# Patient Record
Sex: FEMALE | Race: BLACK OR AFRICAN AMERICAN | NOT HISPANIC OR LATINO
[De-identification: names, ages, dates, MRNs, and addresses within clinical notes are randomized per-mention and may not be internally consistent; named-entity substitution may affect disease eponyms.]

---

## 2018-08-08 ENCOUNTER — APPOINTMENT (OUTPATIENT)
Dept: PLASTIC SURGERY | Facility: CLINIC | Age: 33
End: 2018-08-08

## 2018-09-19 ENCOUNTER — APPOINTMENT (OUTPATIENT)
Dept: PLASTIC SURGERY | Facility: CLINIC | Age: 33
End: 2018-09-19

## 2018-09-19 ENCOUNTER — OUTPATIENT (OUTPATIENT)
Dept: OUTPATIENT SERVICES | Facility: HOSPITAL | Age: 33
LOS: 1 days | End: 2018-09-19

## 2018-09-19 VITALS
RESPIRATION RATE: 16 BRPM | HEART RATE: 106 BPM | BODY MASS INDEX: 25.66 KG/M2 | SYSTOLIC BLOOD PRESSURE: 112 MMHG | DIASTOLIC BLOOD PRESSURE: 77 MMHG | WEIGHT: 154 LBS | HEIGHT: 65 IN

## 2018-10-23 ENCOUNTER — OUTPATIENT (OUTPATIENT)
Dept: OUTPATIENT SERVICES | Facility: HOSPITAL | Age: 33
LOS: 1 days | End: 2018-10-23

## 2018-10-23 ENCOUNTER — APPOINTMENT (OUTPATIENT)
Dept: PLASTIC SURGERY | Facility: CLINIC | Age: 33
End: 2018-10-23

## 2018-10-24 DIAGNOSIS — Z01.812 ENCOUNTER FOR PREPROCEDURAL LABORATORY EXAMINATION: ICD-10-CM

## 2018-12-06 ENCOUNTER — OUTPATIENT (OUTPATIENT)
Dept: OUTPATIENT SERVICES | Facility: HOSPITAL | Age: 33
LOS: 1 days | End: 2018-12-06

## 2018-12-06 ENCOUNTER — APPOINTMENT (OUTPATIENT)
Dept: PLASTIC SURGERY | Facility: CLINIC | Age: 33
End: 2018-12-06

## 2018-12-06 VITALS
RESPIRATION RATE: 14 BRPM | DIASTOLIC BLOOD PRESSURE: 71 MMHG | HEART RATE: 75 BPM | TEMPERATURE: 99.6 F | SYSTOLIC BLOOD PRESSURE: 112 MMHG

## 2018-12-07 DIAGNOSIS — Z01.812 ENCOUNTER FOR PREPROCEDURAL LABORATORY EXAMINATION: ICD-10-CM

## 2019-01-04 ENCOUNTER — OUTPATIENT (OUTPATIENT)
Dept: OUTPATIENT SERVICES | Facility: HOSPITAL | Age: 34
LOS: 1 days | Discharge: HOME | End: 2019-01-04

## 2019-01-04 ENCOUNTER — TRANSCRIPTION ENCOUNTER (OUTPATIENT)
Age: 34
End: 2019-01-04

## 2019-01-04 RX ORDER — CEPHALEXIN 500 MG
1 CAPSULE ORAL
Qty: 21 | Refills: 0
Start: 2019-01-04 | End: 2019-01-10

## 2019-01-10 ENCOUNTER — APPOINTMENT (OUTPATIENT)
Dept: PLASTIC SURGERY | Facility: CLINIC | Age: 34
End: 2019-01-10

## 2019-01-10 ENCOUNTER — OUTPATIENT (OUTPATIENT)
Dept: OUTPATIENT SERVICES | Facility: HOSPITAL | Age: 34
LOS: 1 days | End: 2019-01-10

## 2019-01-10 VITALS
DIASTOLIC BLOOD PRESSURE: 78 MMHG | HEART RATE: 106 BPM | RESPIRATION RATE: 14 BRPM | SYSTOLIC BLOOD PRESSURE: 124 MMHG | TEMPERATURE: 98.5 F

## 2019-01-10 NOTE — PHYSICAL EXAM
[de-identified] : cast taken down\par incisions cdi\par swollen tip and nose\par minimal bruising\par cartliage graft intact\par tip of nose with superficial epidermolysis approx 0.5cm

## 2019-01-10 NOTE — ASSESSMENT
[FreeTextEntry1] : 33 yr F s/p secondary rhinoplasty 1 wk prior\par \par - cast taken off\par - half sutures removed\par - fu monday \par - ok to cleanse area gently

## 2019-01-10 NOTE — ASSESSMENT
[FreeTextEntry1] : 33 yr F s/p secondary rhinoplasty \par \par \par - cast taken off\par - half of sutures removed\par - follow up mon afternoon\par - ok to clean areas gently

## 2019-01-10 NOTE — HISTORY OF PRESENT ILLNESS
[FreeTextEntry1] : s/p secondary rhinoplasty last friday\par doing well\par no issues\par is happy with results\par

## 2019-01-11 DIAGNOSIS — Z09 ENCOUNTER FOR FOLLOW-UP EXAMINATION AFTER COMPLETED TREATMENT FOR CONDITIONS OTHER THAN MALIGNANT NEOPLASM: ICD-10-CM

## 2019-01-16 ENCOUNTER — APPOINTMENT (OUTPATIENT)
Dept: PLASTIC SURGERY | Facility: CLINIC | Age: 34
End: 2019-01-16

## 2019-01-16 ENCOUNTER — OUTPATIENT (OUTPATIENT)
Dept: OUTPATIENT SERVICES | Facility: HOSPITAL | Age: 34
LOS: 1 days | End: 2019-01-16

## 2019-01-16 DIAGNOSIS — Z09 ENCOUNTER FOR FOLLOW-UP EXAMINATION AFTER COMPLETED TREATMENT FOR CONDITIONS OTHER THAN MALIGNANT NEOPLASM: ICD-10-CM

## 2019-01-17 ENCOUNTER — APPOINTMENT (OUTPATIENT)
Dept: PLASTIC SURGERY | Facility: CLINIC | Age: 34
End: 2019-01-17

## 2019-01-17 ENCOUNTER — OUTPATIENT (OUTPATIENT)
Dept: OUTPATIENT SERVICES | Facility: HOSPITAL | Age: 34
LOS: 1 days | End: 2019-01-17

## 2019-01-17 NOTE — PHYSICAL EXAM
[de-identified] : smooth dorsal aesthetic lines\par swollen tip and nose, slightly derotated as expected\par incisions well healed\par stable  dorsal cartilage graft

## 2019-01-17 NOTE — HISTORY OF PRESENT ILLNESS
[FreeTextEntry1] : doing well, she loves her nose\par she desires liposuction of the abdomen now and umbilicoplasty\par she also wants cheek fillers

## 2019-01-17 NOTE — ASSESSMENT
[FreeTextEntry1] : 33 yr F s/p secondary rhinoplasty\par \par -all sutures removed\par - quote for lipo and umbilicoplasty\par -f/u 3 wks for fillers and general f/u

## 2019-01-18 DIAGNOSIS — Z09 ENCOUNTER FOR FOLLOW-UP EXAMINATION AFTER COMPLETED TREATMENT FOR CONDITIONS OTHER THAN MALIGNANT NEOPLASM: ICD-10-CM

## 2019-03-03 ENCOUNTER — TRANSCRIPTION ENCOUNTER (OUTPATIENT)
Age: 34
End: 2019-03-03

## 2019-03-27 ENCOUNTER — APPOINTMENT (OUTPATIENT)
Dept: PLASTIC SURGERY | Facility: CLINIC | Age: 34
End: 2019-03-27

## 2019-03-27 ENCOUNTER — OUTPATIENT (OUTPATIENT)
Dept: OUTPATIENT SERVICES | Facility: HOSPITAL | Age: 34
LOS: 1 days | End: 2019-03-27

## 2019-03-27 VITALS — BODY MASS INDEX: 25.46 KG/M2 | WEIGHT: 153 LBS

## 2019-03-27 NOTE — HISTORY OF PRESENT ILLNESS
[FreeTextEntry1] : marisol is here for follow up as well as inquiry for new procedures.\par she is happy with her nose\par she is concerned about her upper lip length and tooth show in repose and has seen oral surgeons for this as well as received cephalograms- she has a class 2 relationship but not enough to warrant a lefort 1.\par she is inquiring about liposuction of her abdomen, umbilical revision, and fat transfer to her breasts

## 2019-03-27 NOTE — ASSESSMENT
[FreeTextEntry1] : 33 yr F s/p open secondary rhino now here for lipo and fat transfer to breasts with umbilical scar revision\par \par - her nose is healing well. i informed her it would take a year for all swelling to go down at minimum. she is happy with her results\par - she desires lipo which is reasonable with fat transfer to breast. i informed her about a 50% survival rate of fat. she may need a round 2 or 3 which she is aware of and I informed her that I cant guarantee a D size but she is understanding. \par - her umbilicus is the result of a hernia which I told her we can fix but would require a scar around the area. she is agreeable\par \par - with regards to her upper lip, she can try botox, but I don’t think upper jaw surgery is recommended\par - we can attempt a mandibular angle shaving if she desires, but this would require preplanning, and imaging. \par she will think about it

## 2019-03-27 NOTE — PHYSICAL EXAM
[de-identified] : incisions well healed\par tip swollen\par symmetric dorsal aesthic lines\par no warping of dorsal cartilage grafts\par good tip projection and nasal length\par alar columellar relations improved\par nostril width similar to intercanthal distance\par \par she does not like her upper lip and tooth show on repose. she has a slight gummy smile with class 2 relationship\par her jaw appears sqaured in nature [de-identified] : breasts with striae , but around a B to C cup\par symmetric, grade 1 ptosis [de-identified] : good skin elasticity no scars\par umbilicus wide, protruberant, wrinkled

## 2019-03-27 NOTE — REASON FOR VISIT
[Consultation] : a consultation visit [FreeTextEntry1] : liposuction abdomen,flanks,repair of umbilicus

## 2019-03-28 DIAGNOSIS — Z01.89 ENCOUNTER FOR OTHER SPECIFIED SPECIAL EXAMINATIONS: ICD-10-CM

## 2019-04-10 ENCOUNTER — APPOINTMENT (OUTPATIENT)
Dept: PLASTIC SURGERY | Facility: CLINIC | Age: 34
End: 2019-04-10
Payer: COMMERCIAL

## 2019-04-10 ENCOUNTER — APPOINTMENT (OUTPATIENT)
Dept: PLASTIC SURGERY | Facility: CLINIC | Age: 34
End: 2019-04-10

## 2019-04-10 PROCEDURE — 99202 OFFICE O/P NEW SF 15 MIN: CPT

## 2019-04-10 NOTE — HISTORY OF PRESENT ILLNESS
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

## 2019-05-08 ENCOUNTER — TRANSCRIPTION ENCOUNTER (OUTPATIENT)
Age: 34
End: 2019-05-08

## 2019-05-24 ENCOUNTER — APPOINTMENT (OUTPATIENT)
Dept: PLASTIC SURGERY | Facility: CLINIC | Age: 34
End: 2019-05-24

## 2019-05-24 ENCOUNTER — APPOINTMENT (OUTPATIENT)
Dept: PLASTIC SURGERY | Facility: CLINIC | Age: 34
End: 2019-05-24
Payer: COMMERCIAL

## 2019-05-24 PROCEDURE — 14060 TIS TRNFR E/N/E/L 10 SQ CM/<: CPT

## 2019-05-28 ENCOUNTER — APPOINTMENT (OUTPATIENT)
Dept: PLASTIC SURGERY | Facility: CLINIC | Age: 34
End: 2019-05-28

## 2019-05-28 ENCOUNTER — OUTPATIENT (OUTPATIENT)
Dept: OUTPATIENT SERVICES | Facility: HOSPITAL | Age: 34
LOS: 1 days | End: 2019-05-28

## 2019-05-28 NOTE — PROCEDURE
[FreeTextEntry1] : midface hyperplasia [FreeTextEntry4] : min [FreeTextEntry3] : lido w/ epi [FreeTextEntry2] : v-y lengthening of upper lip, 4x2cm [FreeTextEntry5] : none [FreeTextEntry6] : IC obtained. lido w/ epi injected,  15 blade used to incise horizontally.  4x2cm advancement performed.  closed incision vertically as v-y\par

## 2019-05-29 DIAGNOSIS — Z01.812 ENCOUNTER FOR PREPROCEDURAL LABORATORY EXAMINATION: ICD-10-CM

## 2019-05-31 ENCOUNTER — APPOINTMENT (OUTPATIENT)
Dept: PLASTIC SURGERY | Facility: CLINIC | Age: 34
End: 2019-05-31
Payer: COMMERCIAL

## 2019-05-31 PROCEDURE — 99024 POSTOP FOLLOW-UP VISIT: CPT

## 2019-06-07 ENCOUNTER — APPOINTMENT (OUTPATIENT)
Dept: PLASTIC SURGERY | Facility: CLINIC | Age: 34
End: 2019-06-07
Payer: COMMERCIAL

## 2019-06-07 PROCEDURE — 99024 POSTOP FOLLOW-UP VISIT: CPT

## 2019-06-11 ENCOUNTER — OUTPATIENT (OUTPATIENT)
Dept: OUTPATIENT SERVICES | Facility: HOSPITAL | Age: 34
LOS: 1 days | Discharge: HOME | End: 2019-06-11

## 2019-06-19 ENCOUNTER — APPOINTMENT (OUTPATIENT)
Dept: PLASTIC SURGERY | Facility: CLINIC | Age: 34
End: 2019-06-19

## 2019-06-19 ENCOUNTER — OUTPATIENT (OUTPATIENT)
Dept: OUTPATIENT SERVICES | Facility: HOSPITAL | Age: 34
LOS: 1 days | End: 2019-06-19

## 2019-06-19 VITALS
BODY MASS INDEX: 26.23 KG/M2 | SYSTOLIC BLOOD PRESSURE: 128 MMHG | WEIGHT: 157.6 LBS | TEMPERATURE: 99.8 F | HEART RATE: 121 BPM | DIASTOLIC BLOOD PRESSURE: 87 MMHG | RESPIRATION RATE: 17 BRPM

## 2019-06-19 VITALS — HEART RATE: 93 BPM | TEMPERATURE: 99.3 F

## 2019-06-19 NOTE — PHYSICAL EXAM
[de-identified] : umbilicus with superifical epidermolysis\par cap refil 2 seconds\par abdomen swollen\par demarcated line where binder was compressing inferiorly

## 2019-06-19 NOTE — ASSESSMENT
[FreeTextEntry1] : 33 yr F s/p liposuction of abdomen with umbilicoplasty\par \par - dc binder as this is causing a demarcated line- informed her to get a girdle\par - sutures removed\par - bacitracin umbilicus daily\par - fu 1 wk

## 2019-06-19 NOTE — HISTORY OF PRESENT ILLNESS
[FreeTextEntry1] : doing well s/p liposuction of abdomen and umbilicoplasty 1 wk prior\par no significant pain\par

## 2019-06-20 DIAGNOSIS — Z09 ENCOUNTER FOR FOLLOW-UP EXAMINATION AFTER COMPLETED TREATMENT FOR CONDITIONS OTHER THAN MALIGNANT NEOPLASM: ICD-10-CM

## 2019-06-26 ENCOUNTER — APPOINTMENT (OUTPATIENT)
Dept: PLASTIC SURGERY | Facility: CLINIC | Age: 34
End: 2019-06-26

## 2019-06-28 ENCOUNTER — APPOINTMENT (OUTPATIENT)
Dept: PLASTIC SURGERY | Facility: CLINIC | Age: 34
End: 2019-06-28
Payer: COMMERCIAL

## 2019-06-28 ENCOUNTER — APPOINTMENT (OUTPATIENT)
Dept: PLASTIC SURGERY | Facility: CLINIC | Age: 34
End: 2019-06-28

## 2019-06-28 PROCEDURE — 99024 POSTOP FOLLOW-UP VISIT: CPT

## 2019-07-22 ENCOUNTER — APPOINTMENT (OUTPATIENT)
Dept: PLASTIC SURGERY | Facility: CLINIC | Age: 34
End: 2019-07-22
Payer: COMMERCIAL

## 2019-07-22 PROCEDURE — 99214 OFFICE O/P EST MOD 30 MIN: CPT | Mod: 24

## 2019-08-06 ENCOUNTER — APPOINTMENT (OUTPATIENT)
Dept: PLASTIC SURGERY | Facility: CLINIC | Age: 34
End: 2019-08-06

## 2019-08-06 ENCOUNTER — OUTPATIENT (OUTPATIENT)
Dept: OUTPATIENT SERVICES | Facility: HOSPITAL | Age: 34
LOS: 1 days | End: 2019-08-06

## 2019-08-07 DIAGNOSIS — Z09 ENCOUNTER FOR FOLLOW-UP EXAMINATION AFTER COMPLETED TREATMENT FOR CONDITIONS OTHER THAN MALIGNANT NEOPLASM: ICD-10-CM

## 2019-08-08 ENCOUNTER — APPOINTMENT (OUTPATIENT)
Dept: PLASTIC SURGERY | Facility: CLINIC | Age: 34
End: 2019-08-08

## 2019-08-08 ENCOUNTER — OUTPATIENT (OUTPATIENT)
Dept: OUTPATIENT SERVICES | Facility: HOSPITAL | Age: 34
LOS: 1 days | End: 2019-08-08

## 2019-08-12 ENCOUNTER — APPOINTMENT (OUTPATIENT)
Dept: PLASTIC SURGERY | Facility: CLINIC | Age: 34
End: 2019-08-12

## 2019-08-12 DIAGNOSIS — Z09 ENCOUNTER FOR FOLLOW-UP EXAMINATION AFTER COMPLETED TREATMENT FOR CONDITIONS OTHER THAN MALIGNANT NEOPLASM: ICD-10-CM

## 2019-08-12 NOTE — PHYSICAL EXAM
[de-identified] : AA Female, Rosales VI, thick oily skin.  [de-identified] : Nasal Analysis-  moderate post-op swelling still present grossly and on palpation (mostly to tip)\par                            DALs symmetric, bony vault wnl `80% of base\par                            Wide alar base. Maintained  dorsal height. Underprojected nasal tip on profile view

## 2019-08-12 NOTE — ASSESSMENT
[FreeTextEntry1] : 34 y/o AA F 8 months s/p Revision Rhinoplasty presents with persistent complaint regarding her alar appearance. Based on an extensive and prolonged discussion regarding her identified deformity areas-areas to address we clarified that the width of her nostril is the primary factor that she would like addressed. The patient was seen with Dr Peres (Clinic Attending) this visit , during which we once more discussed the various factors at play in her still healing nose as well as the associated nasal appearances of her ethnicity.  It is our typical practice to forgo performing any revision rhinoplasty until at least one year out from a previous intervention/rhinoplasty so as to allow the swelling and inflammation to subside and better account for the final result. However, based on this patient's now characterized and identified focal complaint we offered performing a nostril sill excision, as the only possible intervention at this time, as it would not involve manipulation or excision of inflamed tissue and would be a targeted effort to address the widened appearance of her nostrils (again that she has identified as bothering her the most). This would be a conservative maneuver, possible to be done in clinic. \par \par She has agreed to proceed with this intervention, with the understanding that with each further revision her results may be less predictable. She understands that symmetry and scarring are key aspects of this procedure that are in particular difficult to predict.   \par \par We shall coordinate this procedure based on clinic staff availability in the coming months. \par

## 2019-08-12 NOTE — HISTORY OF PRESENT ILLNESS
[FreeTextEntry1] : 33 year old AA female known to this clinic, now 8 months s/p revision rhinoplasty by Dr. Oliveira, presents today for interval follow-up. She reports being displeased with the appearance of her ala. Of note, the patient has been seen by multiple plastic surgeons in the past 3 months regarding this and her other complaint of excessive toothy show on smile and repose (per pt she is awaiting insurance approval for vestibuloplasty by Dr Walker). She has difficulty identifying the particular aspect of her ala she does not like, namely the "roundness" and "shape" are not what she was expecting post-operative and appreciated her basal view more prior to her last revision rhinoplasty. She presents with multiple instagram pictures (of various different surgeons and patients) in hope of better communicating her desired look. Denies breathing issues. Overall, pleased with the projection and dorsal height of her nose, but feels the base and ala make her "look like a transgender," causing a loss of confidence.

## 2019-08-13 NOTE — HISTORY OF PRESENT ILLNESS
[FreeTextEntry1] : Patient is a 33 year old   woman who is known to the clinic. She presents with dissatisfaction of the appearance of her nasal alar width and shape after undergoing revision rhinoplasty in January 2018. 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 She also has complaint of excessive tooth show on smile and repose, dissatisfaction with facial acne scars, and hypertrophic scarring of her umbilicus after hernia repair and scar revision by a plastic surgeon. She is has aksi scheduled with another plastic surgeon to have a vestibuloplasty to correct her excessive tooth show.\par \par

## 2019-08-26 ENCOUNTER — APPOINTMENT (OUTPATIENT)
Dept: PLASTIC SURGERY | Facility: CLINIC | Age: 34
End: 2019-08-26

## 2019-08-28 ENCOUNTER — OUTPATIENT (OUTPATIENT)
Dept: OUTPATIENT SERVICES | Facility: HOSPITAL | Age: 34
LOS: 1 days | End: 2019-08-28

## 2019-08-28 ENCOUNTER — APPOINTMENT (OUTPATIENT)
Dept: PLASTIC SURGERY | Facility: CLINIC | Age: 34
End: 2019-08-28

## 2019-08-29 NOTE — ASSESSMENT
[FreeTextEntry1] : 34 yo AA Female presents approx 8 months s/p revision rhinoplasty with the persistent complaint of the appearance of her ala and self-identified excessive nostril show on frontal and lateral angles. We discussed the risks and benefits of undergoing further revisions. And based on extensive communication agreed to proceed with a conservative and focal revision effort of addressing her nostril show by performing a nostril sill reduction. \par This was performed in office today. The patient tolerated the procedure well and will return in 5-7 days for suture removal and interval follow-up. \par \par Dr. Chris was present/scrubbed for and supervised this procedure.

## 2019-08-29 NOTE — ASSESSMENT
[FreeTextEntry1] : 32 yo AA Female presents approx 8 months s/p revision rhinoplasty with the persistent complaint of the appearance of her ala and self-identified excessive nostril show on frontal and lateral angles. We discussed the risks and benefits of undergoing further revisions. And based on extensive communication agreed to proceed with a conservative and focal revision effort of addressing her nostril show by performing a nostril sill reduction. \par This was performed in office today. The patient tolerated the procedure well and will return in 5-7 days for suture removal and interval follow-up. \par \par Dr. Chris was present/scrubbed for and supervised this procedure.

## 2019-08-29 NOTE — PROCEDURE
[FreeTextEntry1] : Nasal Deformity- Enlarged Nares/Nostril Show;  [FreeTextEntry2] : Nasal Sill Reduction [FreeTextEntry3] : Local Anesthetic [FreeTextEntry4] : Less than 1cc [FreeTextEntry5] : None [FreeTextEntry6] : TIME OUT PERFORMED\par \par 5cc of 1% Lidocaine w/Epinephrine injected into bilateral nostril heather, alar bases, and infra-collumellar area. \par \par 15 minutes wait time performed for optimal anesthetic and vasoconstriction effect.\par \par Nasal area prepped and sterile drapes placed. Measurements taken and landmarks marked with marker. Assymetry noted (L>R).  #11 Blade employed to render wedge shaped excision of inferior nostril sill with extension towards alar base. \par \par 5-0 PDS for deep closure. 5-0 Plain Gut and 5-0 Nylon placed in interrupted fashion for superficial reapproximation. Bacitracin applied over incisions. \par \par Patient tolerated the procedure without issue.   [___ ml Inj] : Anesthesia: [unfilled] ~Uml [1%] : 1% [With Epi] : with epinephrine [Betadine] : using betadine [Simple Sutures] : simple sutures were used for the skin closure [___ # of Sutures] : [unfilled] [Size: ___-0] : [unfilled]-0 [Interrupted] : interrupted [Nylon] : nylon suture(s) [FreeTextEntry8] : Nostril Sill (bilateral)

## 2019-08-30 DIAGNOSIS — Z09 ENCOUNTER FOR FOLLOW-UP EXAMINATION AFTER COMPLETED TREATMENT FOR CONDITIONS OTHER THAN MALIGNANT NEOPLASM: ICD-10-CM

## 2019-09-04 ENCOUNTER — APPOINTMENT (OUTPATIENT)
Dept: PLASTIC SURGERY | Facility: CLINIC | Age: 34
End: 2019-09-04

## 2019-09-04 ENCOUNTER — OUTPATIENT (OUTPATIENT)
Dept: OUTPATIENT SERVICES | Facility: HOSPITAL | Age: 34
LOS: 1 days | End: 2019-09-04

## 2019-09-04 DIAGNOSIS — Z09 ENCOUNTER FOR FOLLOW-UP EXAMINATION AFTER COMPLETED TREATMENT FOR CONDITIONS OTHER THAN MALIGNANT NEOPLASM: ICD-10-CM

## 2019-09-04 NOTE — PHYSICAL EXAM
[de-identified] : Conversive female in no acute distress.  [de-identified] : Well healing incisions along bilateral nostril heather. \par Nylons in place.

## 2019-09-04 NOTE — ASSESSMENT
[FreeTextEntry1] : 32 yo AA female presents 7 days out from her in-office procedure- Bilateral Nostril Sill Reduction.  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  Also discussed possible fractora procedure for her acne scarring to be done in future.  \par - Sutures removed\par - Wound Care reiterated

## 2019-09-13 ENCOUNTER — APPOINTMENT (OUTPATIENT)
Dept: PLASTIC SURGERY | Facility: CLINIC | Age: 34
End: 2019-09-13
Payer: COMMERCIAL

## 2019-09-13 PROCEDURE — 99212 OFFICE O/P EST SF 10 MIN: CPT

## 2019-09-19 ENCOUNTER — OUTPATIENT (OUTPATIENT)
Dept: OUTPATIENT SERVICES | Facility: HOSPITAL | Age: 34
LOS: 1 days | End: 2019-09-19

## 2019-09-19 ENCOUNTER — APPOINTMENT (OUTPATIENT)
Dept: PLASTIC SURGERY | Facility: CLINIC | Age: 34
End: 2019-09-19

## 2019-09-19 NOTE — PROCEDURE
[Nl] : None [FreeTextEntry1] : Periapical Hypoplasia [FreeTextEntry2] : HA Filler Injection to Periapical Region of Face [FreeTextEntry6] : Timeout performed. Area prepped with alcohol wipe. \par \par Landmarks well visualized. Patient provided with mirror to assess procedural process and provide input.\par \par HA filler carefully placed deep down to periosteum to clinical improvement of periapical sulcus along her endorsed goals. \par \par Patient tolerated the procedure well. Post-procedure activity restrictions reiterated to patient. Icepack provided.  \par \par Pre-and-Post injection procedure photos obtained.\par  [FreeTextEntry7] : None

## 2019-09-19 NOTE — REASON FOR VISIT
[Procedure: _________] : a [unfilled] procedure visit [FreeTextEntry1] : Filler Injections to Periapical Region

## 2019-09-19 NOTE — ASSESSMENT
[FreeTextEntry1] : 34 yo AA Female presents approx 9 months s/p revision rhinoplasty with the persistent complaint of the appearance of her ala and self-identified excessive nostril show for which she underwent nostril sill reduction. She is overall pleased with her result.  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 Based on our discussions we discussed one appraoch to improving the appearance of this would be to try filler placement to render soft-tissue camouflage and improve the contour transition from her nose-to-cheek-upper lip.  She was amenable to this and presented today for planned LAZARO Filler Procedure. \par \par 1cc of Voluma XC was placed today. The patient tolerated the procedure well and without issue.\par \par Dr. Haider was present/scrubbed for and supervised this procedure.

## 2019-09-20 DIAGNOSIS — Z09 ENCOUNTER FOR FOLLOW-UP EXAMINATION AFTER COMPLETED TREATMENT FOR CONDITIONS OTHER THAN MALIGNANT NEOPLASM: ICD-10-CM

## 2019-09-30 ENCOUNTER — APPOINTMENT (OUTPATIENT)
Dept: PLASTIC SURGERY | Facility: CLINIC | Age: 34
End: 2019-09-30
Payer: COMMERCIAL

## 2019-09-30 DIAGNOSIS — M95.0 ACQUIRED DEFORMITY OF NOSE: ICD-10-CM

## 2019-09-30 DIAGNOSIS — Q18.9 CONGENITAL MALFORMATION OF FACE AND NECK, UNSPECIFIED: ICD-10-CM

## 2019-09-30 PROCEDURE — 99214 OFFICE O/P EST MOD 30 MIN: CPT

## 2019-09-30 NOTE — PHYSICAL EXAM
[de-identified] : alert, calm, cooperative\par  [de-identified] : normocephalic, atraumatic [de-identified] : CINDI BARRONL [de-identified] : mild maxillary tooth show at rest.  Nasal incisions are healing well and nose is proportionate to facial features. [de-identified] : trachea midline.  no masses or adenopathy [de-identified] : respirations are even and unlabored\par

## 2019-09-30 NOTE — HISTORY OF PRESENT ILLNESS
[FreeTextEntry1] : Wil is a 34 year old female who presents for a follow up evaluation.  Patient underwent revision rhinoplasty at [facility] in August.  She is unhappy with the appearance of the nose from profile view.  Feels it looks masculine.  She is also concerned over the appearance of her upper tooth show at rest.  She would like to discuss jaw surgery and rhinoplasty revision.

## 2019-10-11 ENCOUNTER — APPOINTMENT (OUTPATIENT)
Dept: PLASTIC SURGERY | Facility: CLINIC | Age: 34
End: 2019-10-11

## 2019-10-17 NOTE — HISTORY OF PRESENT ILLNESS
[FreeTextEntry1] : dOS: 5/24/19- S/P lip lengthening of upper lip for correction of facial asymmetry\par Pt also had nasal sill reduction for enlarged nare/nostril show at frontal and lateral angles on 8/28/19 after having a a revision rhinoplasty in Jan 2019\par Pt now here to discuss persistent lip asymmetry\par

## 2019-11-21 ENCOUNTER — OUTPATIENT (OUTPATIENT)
Dept: OUTPATIENT SERVICES | Facility: HOSPITAL | Age: 34
LOS: 1 days | End: 2019-11-21

## 2019-11-21 ENCOUNTER — APPOINTMENT (OUTPATIENT)
Dept: PLASTIC SURGERY | Facility: CLINIC | Age: 34
End: 2019-11-21

## 2019-11-22 DIAGNOSIS — Z09 ENCOUNTER FOR FOLLOW-UP EXAMINATION AFTER COMPLETED TREATMENT FOR CONDITIONS OTHER THAN MALIGNANT NEOPLASM: ICD-10-CM

## 2019-11-23 NOTE — PROCEDURE
[FreeTextEntry1] : Periapical hypoplasia [FreeTextEntry2] : Hyaluronidase injection to midface-  periapical facial region [FreeTextEntry6] : Timeout performed. \par Area prepped with alcohol wipe\par \par Landmarks well visualized. \par Hylauronidase carefully injected deep into periapical region, followed by digital massage. A total of 1cc injected, providing 150U.\par \par Patient tolerated the procedure well. \par Post-procedure activity restrictions reiterated to patient. \par Icepack provided. \par \par \par  [Nl] : None

## 2019-11-23 NOTE — ASSESSMENT
[FreeTextEntry1] : 34 AA female presents for follow-up. Patient endorsing sporadic vague symptoms of pruitis and discomfort focal to her periapical region, that was previously injected with HA filler to render soft tissue camouflage of her periapical hypoplasia. While it is uncertain that her symptoms are related to the filler and that removal of the injected HA will improve her symptoms she would like to have it removed. This was discussed at length, prior to her consenting to removal of the filler with injection of Hyaluronidase. \par \par Consent obtained.\par - 150U of Hyaluronidase injected to periapical region and massaged. \par \par Patient tolerated this procedure well, without issue.

## 2019-12-06 ENCOUNTER — APPOINTMENT (OUTPATIENT)
Dept: PLASTIC SURGERY | Facility: CLINIC | Age: 34
End: 2019-12-06
Payer: COMMERCIAL

## 2019-12-06 PROCEDURE — 99212 OFFICE O/P EST SF 10 MIN: CPT

## 2019-12-10 NOTE — HISTORY OF PRESENT ILLNESS
[FreeTextEntry1] : 33 yo F w/ scalp mass, present for several months  - increasing in size.  denies drainage\par reports discomfort\par no change in pmh/psh\par nkda\par

## 2019-12-19 ENCOUNTER — OUTPATIENT (OUTPATIENT)
Dept: OUTPATIENT SERVICES | Facility: HOSPITAL | Age: 34
LOS: 1 days | End: 2019-12-19

## 2019-12-19 ENCOUNTER — APPOINTMENT (OUTPATIENT)
Dept: PLASTIC SURGERY | Facility: CLINIC | Age: 34
End: 2019-12-19

## 2019-12-19 NOTE — ASSESSMENT
[FreeTextEntry1] : 33 y/o AA female presents for interval follow-up and to once more discuss revision rhinoplasty surgery. She continues to present conflicting and inconsistent goals and desires regarding her aesthetic improvements. Furthermore, her fervent desire to proceed with a surgery to "fix how people perceive her to be a transgender" is of concern to me, as it stems from an unclear ability to divulge a clear finding on hers that she feels is the culprit (in the past she has endorsed her wide dorsum, large teeth....today it is the bulbous tip and "straight line" ala). We again discussed her overall fair to good result, which is still undergoing recovery being only 11 months out. We objectively pointed out deformities or deficiencies one can discern with knowledge of certain aesthetic parameters, to which she seemed not fully understanding of. She denies any breathing issues or functional issues.\par \par I cautioned her in her pursuit to obtain a "surgery fix" to improve her self perceived societal perception. It is in my opinion that she best be treated by a Rhinoplasty Expert, as this would be her third rhinoplasty in 3 years. I referred her to multiple colleagues with greater experience, but she is hesitant being price conscious. \par \par By the end of this encounter I again counseled her to take more time to think about all this, and allow for further healing from her most recent rhinoplasty. In the interim I would reach out to my rhinoplasty expert colleagues and discuss her case or see if they would be willing to take her on as a patient.

## 2019-12-19 NOTE — HISTORY OF PRESENT ILLNESS
[FreeTextEntry1] : Wil returns for a repeat interval follow-up visit, now 11 months out from her secondary rhino by my predecessor Dr Oliveira. She reports having seen multiples specialists- craniofacial surgeons and rhinoplasty specialists to address her perceived deformities. She continues to stress that her nose feels "like a boys nose." And that she would like "a surgery done to improve it." She has on multiple occasions and once more today brought in literature and various instagram pictures of other rhinoplasty results and procedures. She reports multiple vague and inconsistent complaints regarding her present appearance and what she would like improved. She repeats that she "has waited long enough" despite being told by all practitioners that’s waiting at least a period of a year is expected for rhinoplasty surgery. \par \par Denies any breathing issues.

## 2019-12-19 NOTE — PHYSICAL EXAM
[de-identified] : AA female. \par Blunted affect. \par Dazed in conversation, with conflicting commentary.  [de-identified] : Fairly proportionate face.  [de-identified] : W [de-identified] : Well healed incsions. \par Thin dorsum, with readily palpable graft. \par Nasal tip firm to touch. Non-tender. \par Suboptimal projection on lateral profile, with an over rotated tip. \par +Alar notching  \par Periapical hypoplasia.

## 2019-12-19 NOTE — PHYSICAL EXAM
[de-identified] : AA female. \par Blunted affect. \par Dazed in conversation, with conflicting commentary.  [de-identified] : Fairly proportionate face.  [de-identified] : W [de-identified] : Well healed incsions. \par Thin dorsum, with readily palpable graft. \par Nasal tip firm to touch. Non-tender. \par Suboptimal projection on lateral profile, with an over rotated tip. \par +Alar notching  \par Periapical hypoplasia.

## 2019-12-19 NOTE — ASSESSMENT
[FreeTextEntry1] : 35 y/o AA female presents for interval follow-up and to once more discuss revision rhinoplasty surgery. She continues to present conflicting and inconsistent goals and desires regarding her aesthetic improvements. Furthermore, her fervent desire to proceed with a surgery to "fix how people perceive her to be a transgender" is of concern to me, as it stems from an unclear ability to divulge a clear finding on hers that she feels is the culprit (in the past she has endorsed her wide dorsum, large teeth....today it is the bulbous tip and "straight line" ala). We again discussed her overall fair to good result, which is still undergoing recovery being only 11 months out. We objectively pointed out deformities or deficiencies one can discern with knowledge of certain aesthetic parameters, to which she seemed not fully understanding of. She denies any breathing issues or functional issues.\par \par I cautioned her in her pursuit to obtain a "surgery fix" to improve her self perceived societal perception. It is in my opinion that she best be treated by a Rhinoplasty Expert, as this would be her third rhinoplasty in 3 years. I referred her to multiple colleagues with greater experience, but she is hesitant being price conscious. \par \par By the end of this encounter I again counseled her to take more time to think about all this, and allow for further healing from her most recent rhinoplasty. In the interim I would reach out to my rhinoplasty expert colleagues and discuss her case or see if they would be willing to take her on as a patient.

## 2019-12-19 NOTE — PHYSICAL EXAM
[de-identified] : AA female. \par Blunted affect. \par Dazed in conversation, with conflicting commentary.  [de-identified] : Fairly proportionate face.  [de-identified] : W [de-identified] : Well healed incsions. \par Thin dorsum, with readily palpable graft. \par Nasal tip firm to touch. Non-tender. \par Suboptimal projection on lateral profile, with an over rotated tip. \par +Alar notching  \par Periapical hypoplasia.

## 2019-12-20 ENCOUNTER — APPOINTMENT (OUTPATIENT)
Dept: PLASTIC SURGERY | Facility: CLINIC | Age: 34
End: 2019-12-20
Payer: COMMERCIAL

## 2019-12-20 ENCOUNTER — RESULT REVIEW (OUTPATIENT)
Age: 34
End: 2019-12-20

## 2019-12-20 DIAGNOSIS — Z09 ENCOUNTER FOR FOLLOW-UP EXAMINATION AFTER COMPLETED TREATMENT FOR CONDITIONS OTHER THAN MALIGNANT NEOPLASM: ICD-10-CM

## 2019-12-20 DIAGNOSIS — R22.0 LOCALIZED SWELLING, MASS AND LUMP, HEAD: ICD-10-CM

## 2019-12-20 PROCEDURE — 21011 EXC FACE LES SC <2 CM: CPT

## 2019-12-20 PROCEDURE — 13120 CMPLX RPR S/A/L 1.1-2.5 CM: CPT | Mod: 59

## 2019-12-20 PROCEDURE — 88305 TISSUE EXAM BY PATHOLOGIST: CPT | Mod: 26

## 2019-12-20 PROCEDURE — 88313 SPECIAL STAINS GROUP 2: CPT | Mod: 26

## 2019-12-23 ENCOUNTER — OUTPATIENT (OUTPATIENT)
Dept: OUTPATIENT SERVICES | Facility: HOSPITAL | Age: 34
LOS: 1 days | End: 2019-12-23
Payer: COMMERCIAL

## 2019-12-23 DIAGNOSIS — L72.0 EPIDERMAL CYST: ICD-10-CM

## 2019-12-23 PROCEDURE — 88313 SPECIAL STAINS GROUP 2: CPT

## 2019-12-23 PROCEDURE — 88305 TISSUE EXAM BY PATHOLOGIST: CPT

## 2020-01-08 LAB — SURGICAL PATHOLOGY STUDY: SIGNIFICANT CHANGE UP

## 2020-01-10 ENCOUNTER — APPOINTMENT (OUTPATIENT)
Dept: PLASTIC SURGERY | Facility: CLINIC | Age: 35
End: 2020-01-10

## 2020-01-20 ENCOUNTER — APPOINTMENT (OUTPATIENT)
Dept: PLASTIC SURGERY | Facility: CLINIC | Age: 35
End: 2020-01-20
Payer: COMMERCIAL

## 2020-01-20 DIAGNOSIS — M26.9 DENTOFACIAL ANOMALY, UNSPECIFIED: ICD-10-CM

## 2020-01-20 PROCEDURE — 99214 OFFICE O/P EST MOD 30 MIN: CPT

## 2020-01-28 ENCOUNTER — FORM ENCOUNTER (OUTPATIENT)
Age: 35
End: 2020-01-28

## 2020-01-29 ENCOUNTER — APPOINTMENT (OUTPATIENT)
Dept: CT IMAGING | Facility: HOSPITAL | Age: 35
End: 2020-01-29
Payer: COMMERCIAL

## 2020-01-29 ENCOUNTER — OUTPATIENT (OUTPATIENT)
Dept: OUTPATIENT SERVICES | Facility: HOSPITAL | Age: 35
LOS: 1 days | End: 2020-01-29
Payer: COMMERCIAL

## 2020-01-29 PROCEDURE — 70486 CT MAXILLOFACIAL W/O DYE: CPT | Mod: 26

## 2020-01-29 PROCEDURE — 70486 CT MAXILLOFACIAL W/O DYE: CPT

## 2020-03-26 PROBLEM — R22.0 SCALP MASS: Status: ACTIVE | Noted: 2019-12-10

## 2020-03-26 NOTE — PROCEDURE
[FreeTextEntry6] : Preop dx: pilar cyst, scalp\par Postopdx: same\par Procedure: excision 1.2cm scalp mass and complex closure of scalp 1.2cm\par Anesthesia: local 1% w/ epi\par Specimens: to path\par Procedure:\par 	IC obtained. Lesion demarcated.  Lido 1% w/ epi injected.  15 blade used to incise skin.  Lesion encountered in the subdermal plane and dissected circumferentially. Removed in its entirety, 1.2cm.  Skin edges widely undermined and closed in layers with monocryl for a 1.2cm complex closure. Mastisol and steristrips placed.  No complications.  Specimen sent to path\par

## 2020-07-15 PROBLEM — Z00.00 ENCOUNTER FOR PREVENTIVE HEALTH EXAMINATION: Status: ACTIVE | Noted: 2020-07-15

## 2020-07-29 ENCOUNTER — OUTPATIENT (OUTPATIENT)
Dept: OUTPATIENT SERVICES | Facility: HOSPITAL | Age: 35
LOS: 1 days | End: 2020-07-29

## 2020-07-29 ENCOUNTER — APPOINTMENT (OUTPATIENT)
Dept: PLASTIC SURGERY | Facility: CLINIC | Age: 35
End: 2020-07-29

## 2020-07-29 VITALS — WEIGHT: 172 LBS | TEMPERATURE: 99.8 F | BODY MASS INDEX: 28.62 KG/M2

## 2020-08-05 DIAGNOSIS — Z01.89 ENCOUNTER FOR OTHER SPECIFIED SPECIAL EXAMINATIONS: ICD-10-CM

## 2020-08-07 ENCOUNTER — APPOINTMENT (OUTPATIENT)
Dept: PLASTIC SURGERY | Facility: CLINIC | Age: 35
End: 2020-08-07
Payer: COMMERCIAL

## 2020-08-07 PROCEDURE — 99213 OFFICE O/P EST LOW 20 MIN: CPT

## 2020-08-07 NOTE — HISTORY OF PRESENT ILLNESS
[FreeTextEntry1] : 33yo F s/p umbilical hernia repair\par pt w/ pain the umbilical scar and discomfort\par no relevant other pmh/psh\par denies mesh repair\par no hx of infection on trauma\par

## 2020-09-09 ENCOUNTER — APPOINTMENT (OUTPATIENT)
Dept: PLASTIC SURGERY | Facility: CLINIC | Age: 35
End: 2020-09-09

## 2020-09-09 ENCOUNTER — OUTPATIENT (OUTPATIENT)
Dept: OUTPATIENT SERVICES | Facility: HOSPITAL | Age: 35
LOS: 1 days | End: 2020-09-09

## 2020-09-09 DIAGNOSIS — Z09 ENCOUNTER FOR FOLLOW-UP EXAMINATION AFTER COMPLETED TREATMENT FOR CONDITIONS OTHER THAN MALIGNANT NEOPLASM: ICD-10-CM

## 2020-09-09 NOTE — HISTORY OF PRESENT ILLNESS
[FreeTextEntry1] : Patient well known to the clinic.  Interval history she has found someone else to perform her belly button scar revision and she has found someone else to perform her alar base resection.  She now presents wanting BBL.  She showed goal pictures of large volume fat grafting to buttocks.  Discussed with patient that we do not have the appropriate equipment to perform this type of fat grafting procedure.  Patient then said she would be ok with much less, and then said she would be interested in liposuction alone.   Discussed with patient that I did not feel comfortable moving forward with surgery with her given her rapidly changing goals and desired procedures.  Did not feel that we were on the same page in terms of expectations.  Offered to refer patient to other clinics / surgeons for second opinions - patient has my contact information and will reach out if she wishes to pursue this.

## 2020-09-18 ENCOUNTER — APPOINTMENT (OUTPATIENT)
Dept: PLASTIC SURGERY | Facility: CLINIC | Age: 35
End: 2020-09-18
Payer: COMMERCIAL

## 2020-09-18 PROCEDURE — 99213 OFFICE O/P EST LOW 20 MIN: CPT

## 2020-09-18 NOTE — HISTORY OF PRESENT ILLNESS
[FreeTextEntry1] : plan for umbilical reconstruction\par discussed for 15 min the surgical plan\par pt aware of risks including hypertrophic scarring, continued deformity and recurrence\par infection, amongst others\par

## 2020-10-06 ENCOUNTER — RESULT REVIEW (OUTPATIENT)
Age: 35
End: 2020-10-06

## 2020-10-27 ENCOUNTER — OUTPATIENT (OUTPATIENT)
Dept: OUTPATIENT SERVICES | Facility: HOSPITAL | Age: 35
LOS: 1 days | End: 2020-10-27

## 2020-10-27 VITALS
TEMPERATURE: 98 F | HEIGHT: 63 IN | SYSTOLIC BLOOD PRESSURE: 128 MMHG | HEART RATE: 58 BPM | WEIGHT: 166.01 LBS | RESPIRATION RATE: 14 BRPM | DIASTOLIC BLOOD PRESSURE: 70 MMHG | OXYGEN SATURATION: 98 %

## 2020-10-27 DIAGNOSIS — Z98.890 OTHER SPECIFIED POSTPROCEDURAL STATES: Chronic | ICD-10-CM

## 2020-10-27 DIAGNOSIS — K42.9 UMBILICAL HERNIA WITHOUT OBSTRUCTION OR GANGRENE: ICD-10-CM

## 2020-10-27 LAB
HCG UR-SCNC: NEGATIVE — SIGNIFICANT CHANGE UP
SP GR UR: 1.03 — SIGNIFICANT CHANGE UP (ref 1–1.04)

## 2020-10-27 NOTE — H&P PST ADULT - NSICDXPASTSURGICALHX_GEN_ALL_CORE_FT
PAST SURGICAL HISTORY:  H/O abdominoplasty 2019    H/O rhinoplasty 2017, 2019, 2020    S/P myomectomy 2016

## 2020-10-27 NOTE — H&P PST ADULT - HISTORY OF PRESENT ILLNESS
35 year old female presents to PST with preop dx of umbilical hernia w/o obstruction now scheduled for umbilical revision, reports hernia repair in 2019, surgery for scar revision

## 2020-10-27 NOTE — H&P PST ADULT - NEGATIVE OPHTHALMOLOGIC SYMPTOMS
no lacrimation R/no blurred vision L/no photophobia/no blurred vision R/no diplopia/no lacrimation L

## 2020-10-27 NOTE — H&P PST ADULT - ASSESSMENT
Problem: umbilical hernia   Assessment and Plan: Patient scheduled for surgery on 11/04/2020  Patient provided with verbal and written presurgical instructions; verbalized understanding  with teach back.    Patient provided with famotidine for GI prophylaxis; verbalized understanding.    Patient provided with Chlorhexidine wash, verbal and written instructions reviewed. Patient demonstrated understanding with teach back.   Patient confirmed COVID appointment     Urine specimen cup provided

## 2020-10-27 NOTE — H&P PST ADULT - NSICDXPASTMEDICALHX_GEN_ALL_CORE_FT
PAST MEDICAL HISTORY:  Asthma denies at [facility]    History of umbilical hernia 2019

## 2020-10-27 NOTE — H&P PST ADULT - RS GEN PE MLT RESP DETAILS PC
no wheezes/no rales/no rhonchi/clear to auscultation bilaterally/good air movement/respirations non-labored/breath sounds equal/airway patent

## 2020-11-01 ENCOUNTER — APPOINTMENT (OUTPATIENT)
Dept: DISASTER EMERGENCY | Facility: CLINIC | Age: 35
End: 2020-11-01

## 2020-11-01 DIAGNOSIS — Z01.818 ENCOUNTER FOR OTHER PREPROCEDURAL EXAMINATION: ICD-10-CM

## 2020-11-02 LAB — SARS-COV-2 N GENE NPH QL NAA+PROBE: NOT DETECTED

## 2020-11-03 ENCOUNTER — TRANSCRIPTION ENCOUNTER (OUTPATIENT)
Age: 35
End: 2020-11-03

## 2020-11-03 VITALS
RESPIRATION RATE: 16 BRPM | TEMPERATURE: 98 F | OXYGEN SATURATION: 98 % | SYSTOLIC BLOOD PRESSURE: 128 MMHG | HEIGHT: 63 IN | WEIGHT: 166.01 LBS | DIASTOLIC BLOOD PRESSURE: 73 MMHG | HEART RATE: 64 BPM

## 2020-11-04 ENCOUNTER — OUTPATIENT (OUTPATIENT)
Dept: OUTPATIENT SERVICES | Facility: HOSPITAL | Age: 35
LOS: 1 days | Discharge: HOME | End: 2020-11-04

## 2020-11-04 ENCOUNTER — APPOINTMENT (OUTPATIENT)
Dept: PLASTIC SURGERY | Facility: HOSPITAL | Age: 35
End: 2020-11-04
Payer: COMMERCIAL

## 2020-11-04 VITALS
SYSTOLIC BLOOD PRESSURE: 126 MMHG | OXYGEN SATURATION: 100 % | HEART RATE: 81 BPM | TEMPERATURE: 98 F | RESPIRATION RATE: 16 BRPM | DIASTOLIC BLOOD PRESSURE: 77 MMHG

## 2020-11-04 DIAGNOSIS — Z98.890 OTHER SPECIFIED POSTPROCEDURAL STATES: Chronic | ICD-10-CM

## 2020-11-04 DIAGNOSIS — K42.9 UMBILICAL HERNIA WITHOUT OBSTRUCTION OR GANGRENE: ICD-10-CM

## 2020-11-04 PROCEDURE — 14000 TIS TRNFR TRUNK 10 SQ CM/<: CPT

## 2020-11-04 RX ORDER — SODIUM CHLORIDE 9 MG/ML
1000 INJECTION, SOLUTION INTRAVENOUS
Refills: 0 | Status: DISCONTINUED | OUTPATIENT
Start: 2020-11-04 | End: 2020-11-18

## 2020-11-04 NOTE — ASU DISCHARGE PLAN (ADULT/PEDIATRIC) - CARE PROVIDER_API CALL
Yanick Walker  PLASTIC SURGERY  [address] [facility], [place] [address]  Phone: [phone]  Fax: [phone]  Follow Up Time: 1 week

## 2020-11-04 NOTE — BRIEF OPERATIVE NOTE - OPERATION/FINDINGS
Umbilicus opened, subcutaneous tissue on inferior/lateral sides of umbilicus excised. Tacked down to abdominal wall. Closed in primary fashion using deep dermal and interrupted simple sutures.

## 2020-11-04 NOTE — ASU DISCHARGE PLAN (ADULT/PEDIATRIC) - ASU DC SPECIAL INSTRUCTIONSFT
PAIN CONTROL: Take over the counter medications as directed on bottle for pain. Alternating between Tylenol (acetaminophen) and Motrin (ibuprofen) every 3 hours may help with pain control. Take additional prescribed medications as needed, as directed.    DRESSINGS: Wear abdominal binder as much as possible. You have a dressing called Tegaderm over your repair. If this falls off, it is ok. SteriStrips are in place over your incision. Do not remove steristrips. These will fall off on their own.     SHOWER: 24 hours after surgery, it is OK to shower. Do not take a bath. You may let the water run over the incision, but do not scrub. Pat dry. Do not put lotion on incision.    ACTIVITY: No heavy lifting or straining. Otherwise, you may return to your usual level of physical activity. If you are taking narcotic pain medication (such as Percocet) DO NOT drive a car, operate machinery or make important decisions.    DIET: Return to your usual diet.    NOTIFY YOUR SURGEON IF: You have any bleeding that does not stop, any pus draining from your wound(s), any fever (over 100.4 F) or chills, persistent nausea/vomiting, persistent diarrhea, or if your pain is not controlled on your discharge pain medications.    Please follow up with Dr. Walker within x1 week after discharge from the hospital. You may call [phone] to schedule an appointment.

## 2020-11-05 PROBLEM — Z87.19 PERSONAL HISTORY OF OTHER DISEASES OF THE DIGESTIVE SYSTEM: Chronic | Status: ACTIVE | Noted: 2020-10-27

## 2020-11-12 ENCOUNTER — APPOINTMENT (OUTPATIENT)
Dept: PLASTIC SURGERY | Facility: CLINIC | Age: 35
End: 2020-11-12
Payer: COMMERCIAL

## 2020-11-12 DIAGNOSIS — K42.9 UMBILICAL HERNIA W/OUT OBSTRUCTION OR GANGRENE: ICD-10-CM

## 2020-11-12 PROCEDURE — 99024 POSTOP FOLLOW-UP VISIT: CPT

## 2020-11-29 PROBLEM — K42.9 UMBILICAL HERNIA: Status: ACTIVE | Noted: 2020-08-07

## 2020-11-29 NOTE — HISTORY OF PRESENT ILLNESS
[FreeTextEntry1] : DOS 11/04/20 s/p Umbilical reconstruction with local flap. \par Here for follow up.

## 2021-07-21 NOTE — ASU PREOP CHECKLIST - CHLOROHEXIDINE WASH 1
Pt came into clinic, requesting help with basic needs. Requesting food vouchers. Needs assessed. 2 food vouchers given to pt. Denies other needs at this time and will call with any concerns and/or updates  Denies any issues that need doctor's immediate attention at this time. 04-Nov-2020 07:00

## 2021-11-23 NOTE — H&P PST ADULT - NECK DETAILS
Complex Repair And Rotation Flap Text: The defect edges were debeveled with a #15 scalpel blade.  The primary defect was closed partially with a complex linear closure.  Given the location of the remaining defect, shape of the defect and the proximity to free margins a rotation flap was deemed most appropriate for complete closure of the defect.  Using a sterile surgical marker, an appropriate advancement flap was drawn incorporating the defect and placing the expected incisions within the relaxed skin tension lines where possible.    The area thus outlined was incised deep to adipose tissue with a #15 scalpel blade.  The skin margins were undermined to an appropriate distance in all directions utilizing iris scissors. supple/no JVD/normal thyroid gland

## 2024-05-31 NOTE — H&P PST ADULT - NSANTHAGERD_ENT_A_CORE
Pt presented to ED [place], attempted to get initial set of vitals however BP unable to read on two separate machines, attempted to take 3 times.   Per daughter, pt started to have symptoms once got home from hospital at approximately 1800, then rushed to ED.   Charge RN notified and pt brought to desk for stroke assessment.  
Pt taken to IR in [facility] on monitor. All belongings in possession upon transport. Report to IR PÉREZ.   
Report received from PÉREZ Delgado. Pt in red 8 on monitor. Family at bedside  
No